# Patient Record
Sex: FEMALE | Race: WHITE
[De-identification: names, ages, dates, MRNs, and addresses within clinical notes are randomized per-mention and may not be internally consistent; named-entity substitution may affect disease eponyms.]

---
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
## 2020-06-25 LAB
ADD MANUAL DIFF: NO
ANION GAP SERPL CALC-SCNC: 12 MMOL/L (ref 5–19)
APPEARANCE UR: (no result)
APTT PPP: YELLOW S
BASOPHILS # BLD AUTO: 0.1 10^3/UL (ref 0–0.2)
BASOPHILS NFR BLD AUTO: 0.8 % (ref 0–2)
BILIRUB UR QL STRIP: NEGATIVE
BUN SERPL-MCNC: 16 MG/DL (ref 7–20)
CALCIUM: 9.9 MG/DL (ref 8.4–10.2)
CHLORIDE SERPL-SCNC: 96 MMOL/L (ref 98–107)
CO2 SERPL-SCNC: 29 MMOL/L (ref 22–30)
EOSINOPHIL # BLD AUTO: 0.2 10^3/UL (ref 0–0.6)
EOSINOPHIL NFR BLD AUTO: 2.4 % (ref 0–6)
ERYTHROCYTE [DISTWIDTH] IN BLOOD BY AUTOMATED COUNT: 14.2 % (ref 11.5–14)
GLUCOSE SERPL-MCNC: 192 MG/DL (ref 75–110)
GLUCOSE UR STRIP-MCNC: >=500 MG/DL
HCT VFR BLD CALC: 43.7 % (ref 36–47)
HGB BLD-MCNC: 14.5 G/DL (ref 12–15.5)
KETONES UR STRIP-MCNC: NEGATIVE MG/DL
LYMPHOCYTES # BLD AUTO: 3.7 10^3/UL (ref 0.5–4.7)
LYMPHOCYTES NFR BLD AUTO: 41.7 % (ref 13–45)
MCH RBC QN AUTO: 27.2 PG (ref 27–33.4)
MCHC RBC AUTO-ENTMCNC: 33.2 G/DL (ref 32–36)
MCV RBC AUTO: 82 FL (ref 80–97)
MONOCYTES # BLD AUTO: 0.7 10^3/UL (ref 0.1–1.4)
MONOCYTES NFR BLD AUTO: 8 % (ref 3–13)
NEUTROPHILS # BLD AUTO: 4.2 10^3/UL (ref 1.7–8.2)
NEUTS SEG NFR BLD AUTO: 47.1 % (ref 42–78)
NITRITE UR QL STRIP: NEGATIVE
PH UR STRIP: 5 [PH] (ref 5–9)
PLATELET # BLD: 432 10^3/UL (ref 150–450)
POTASSIUM SERPL-SCNC: 4.3 MMOL/L (ref 3.6–5)
PROT UR STRIP-MCNC: NEGATIVE MG/DL
RBC # BLD AUTO: 5.32 10^6/UL (ref 3.72–5.28)
SP GR UR STRIP: 1.02
TOTAL CELLS COUNTED % (AUTO): 100 %
UROBILINOGEN UR-MCNC: NEGATIVE MG/DL (ref ?–2)
WBC # BLD AUTO: 8.8 10^3/UL (ref 4–10.5)

## 2020-06-25 NOTE — RADIOLOGY REPORT (SQ)
EXAM DESCRIPTION:  CHEST PA/LATERAL



IMAGES COMPLETED DATE/TIME:  6/25/2020 10:33 am



REASON FOR STUDY:  PRE-OP



COMPARISON:  9/25/2018



EXAM PARAMETERS:  NUMBER OF VIEWS: two views

TECHNIQUE: Digital Frontal and Lateral radiographic views of the chest acquired.

RADIATION DOSE: NA

LIMITATIONS: none



FINDINGS:  LUNGS AND PLEURA: No opacities, masses or pneumothorax. No pleural effusion.

MEDIASTINUM AND HILAR STRUCTURES: No masses or contour abnormalities.

HEART AND VASCULAR STRUCTURES: Normal heart size.  Mild ectasia of the ascending aorta.

BONES: No acute findings.

HARDWARE: None in the chest.

OTHER: No other significant finding.



IMPRESSION:  NO SIGNIFICANT RADIOGRAPHIC FINDING IN THE CHEST.



TECHNICAL DOCUMENTATION:  JOB ID:  [id number]

 2011 [employer]- All Rights Reserved



Reading location - IP/workstation name: [email or web address]

## 2020-06-30 ENCOUNTER — HOSPITAL ENCOUNTER (OUTPATIENT)
Dept: HOSPITAL 62 - OROUT | Age: 70
LOS: 1 days | Discharge: HOME HEALTH | End: 2020-07-01
Attending: ORTHOPAEDIC SURGERY
Payer: COMMERCIAL

## 2020-06-30 DIAGNOSIS — Z79.82: ICD-10-CM

## 2020-06-30 DIAGNOSIS — M25.511: ICD-10-CM

## 2020-06-30 DIAGNOSIS — M75.121: ICD-10-CM

## 2020-06-30 DIAGNOSIS — Z79.84: ICD-10-CM

## 2020-06-30 DIAGNOSIS — Z79.899: ICD-10-CM

## 2020-06-30 DIAGNOSIS — M25.512: ICD-10-CM

## 2020-06-30 DIAGNOSIS — E11.9: ICD-10-CM

## 2020-06-30 DIAGNOSIS — M75.41: Primary | ICD-10-CM

## 2020-06-30 DIAGNOSIS — M75.122: ICD-10-CM

## 2020-06-30 DIAGNOSIS — J45.909: ICD-10-CM

## 2020-06-30 DIAGNOSIS — M19.011: ICD-10-CM

## 2020-06-30 DIAGNOSIS — Z03.818: ICD-10-CM

## 2020-06-30 LAB
GLUCOSE SERPL-MCNC: 159 MG/DL (ref 75–110)
POTASSIUM SERPL-SCNC: 4.6 MMOL/L (ref 3.6–5)

## 2020-06-30 PROCEDURE — 29826 SHO ARTHRS SRG DECOMPRESSION: CPT

## 2020-06-30 PROCEDURE — 01630 ANES OPN/ARTHR PX SHO JT NOS: CPT

## 2020-06-30 PROCEDURE — 93005 ELECTROCARDIOGRAM TRACING: CPT

## 2020-06-30 PROCEDURE — 71046 X-RAY EXAM CHEST 2 VIEWS: CPT

## 2020-06-30 PROCEDURE — 29806 SHO ARTHRS SRG CAPSULORRAPHY: CPT

## 2020-06-30 PROCEDURE — 81001 URINALYSIS AUTO W/SCOPE: CPT

## 2020-06-30 PROCEDURE — 84132 ASSAY OF SERUM POTASSIUM: CPT

## 2020-06-30 PROCEDURE — 85025 COMPLETE CBC W/AUTO DIFF WBC: CPT

## 2020-06-30 PROCEDURE — 29827 SHO ARTHRS SRG RT8TR CUF RPR: CPT

## 2020-06-30 PROCEDURE — C9803 HOPD COVID-19 SPEC COLLECT: HCPCS

## 2020-06-30 PROCEDURE — 80048 BASIC METABOLIC PNL TOTAL CA: CPT

## 2020-06-30 PROCEDURE — 82947 ASSAY GLUCOSE BLOOD QUANT: CPT

## 2020-06-30 PROCEDURE — 36415 COLL VENOUS BLD VENIPUNCTURE: CPT

## 2020-06-30 PROCEDURE — 87635 SARS-COV-2 COVID-19 AMP PRB: CPT

## 2020-06-30 PROCEDURE — 93010 ELECTROCARDIOGRAM REPORT: CPT

## 2020-06-30 PROCEDURE — 29828 SHO ARTHRS SRG BICP TENODSIS: CPT

## 2020-06-30 RX ADMIN — FENTANYL CITRATE PRN MCG: 50 INJECTION INTRAMUSCULAR; INTRAVENOUS at 14:37

## 2020-06-30 RX ADMIN — FENTANYL CITRATE PRN MCG: 50 INJECTION INTRAMUSCULAR; INTRAVENOUS at 14:32

## 2020-06-30 RX ADMIN — FENTANYL CITRATE PRN MCG: 50 INJECTION INTRAMUSCULAR; INTRAVENOUS at 14:27

## 2020-06-30 RX ADMIN — FENTANYL CITRATE PRN MCG: 50 INJECTION INTRAMUSCULAR; INTRAVENOUS at 14:22

## 2020-06-30 NOTE — DISCHARGE SUMMARY
Discharge Summary (SDC)





- Discharge


Final Diagnosis: 





Right shoulder rotator cuff tear


Date of Surgery: 06/30/20


Discharge Date: 06/30/20


Condition: Good


Treatment or Instructions: 


Schedule Follow Up w/ Dr. Abner Crump @ [facility] to be seen 

in 10-14 days or as scheduled


[place]: [phone] 


[place]: [phone]


[place]: [phone] 





May remove dressing on postop day #3, keep incision covered and dry.





Cryocuff to shoulder





May begin pendulum exercises along w/ hand, wrist and elbow range of motion 4x 

per day or as tolerated.





May remove sling for hygiene purposes otherwise continue it at all times.





Stool softener of choice when on pain medication.





USE OF OVER-THE-COUNTER IBUPROFEN:


     Ibuprofen (Advil, Nuprin, Medipren, Motrin IB) is a medication for fever 

and pain control.  In addition, it has anti- inflammatory effects which may be 

beneficial, especially in the treatment of injuries.


     It's best to take ibuprofen with food.  Persons with ulcer disease or 

allergy to aspirin should notify their physician of this before taking 

ibuprofen.


     Ibuprofen can be given every four to six hours, for a total of four doses 

daily.


     Age              Pain or fever dose          Antiinflammatory dose


     6-8 yr              200 mg (1 tab)                200 mg (1 tab)


     9-11 yr             200 mg (1 tab)                200-400 mg (1-2 tab)


     11-14 yr            200-400 mg (1-2 tab)         400 mg (2 tab)


     15-adult            400 mg (2 tab)                600 mg (3 tab)








ORAL NARCOTIC MEDICATION:


     You have been given a prescription for pain control.  This medication is a 

narcotic.  It's best taken with food, as nausea can result if taken on an empty 

stomach.


     Don't operate machinery or drive within six hours of taking this medic

ation.  Do not combine this medicine with alcohol, or with any medication which 

can cause sedation (such as cold tablets or sleeping pills) unless you get 

permission from the physician.


     Narcotics tend to cause constipation.  If possible, drink plenty of fluids 

and eat a diet high in fiber and fruits.





     Please be aware that prescription narcotics also have the potential for 

abuse.  People become addicted to these medications because of the general sense

of wellbeing that they induce.  This feeling along with a significant reduction 

in tension, anxiety, and aggression provides a stimulating seductive quality to 

these drugs.  Once your pain is under control, we encourage you to discard your 

unused narcotics.





Prescriptions: 


Oxycodone HCl/Acetaminophen [Percocet 5-325 mg Tablet] 1 tab PO Q6 PRN #25 tab


 PRN Reason: 


Referrals: 


OMAR AGUILAR MD [Primary Care Provider] - 


Discharge Diet: As Tolerated


Respiratory Treatments at Home: Deep Breathing/Coughing, Incentive Spirometer


Discharge Activity: No Lifting Over 10 Pounds, No Lifting/Push/Pulling


Report the Following to Your Physician Immediately: Fever over 101 Degrees, 

Unusual Bleeding, Redness, Swelling, Warmth, Increased Soreness

## 2020-06-30 NOTE — OPERATIVE REPORT
Operative Report


DATE OF SURGERY: 06/30/20


PREOPERATIVE DIAGNOSIS: Right shoulder rotator cuff tear, impingement syndrome, 

AC joint DJD


POSTOPERATIVE DIAGNOSIS: Same


OPERATION: Right shoulder arthroscopy with supraspinatus repair, subscapularis 

repair, distal clavicle excision, acromioplasty, subacromial decompression, 

arthroscopic biceps tenodesis


SURGEON: YOBANY CORDOVA


ANESTHESIA: GA


COMPLICATIONS: 





None


ESTIMATED BLOOD LOSS: Minimal


PROCEDURE: 





Indication for above procedure:





69-year-old female with complaints of right shoulder discomfort.  Patient had 

longstanding history of right shoulder discomfort attempted conservative 

measures including injections.  MRI was then performed consistent with rotator 

cuff tear at that point decision was made to proceed with operative 

intervention.  Risk and benefits were explained patient verbalized understanding

consented for surgical procedure.





Procedure In Detail:





Patient was seen and evaluated in the preoperative holding area.  The upper 

extremity was initialized and marked.  Patient received 2g of Ancef IV for 

bacterial prophylaxis.  Patient was taken back to the operative room where 

transferred to the operative table and placed under general anesthesia.  Once 

they were adequately anesthetized patient placed in the beachchair position.  

Cervical spine was placed in neutral position all bony prominences were padded 

including nonoperative upper extremity and bilateral lower extremity.  A 

surgical team debriefing was performed ensuring all instrumentation was 

available, the surgical procedure was discussed with possible concerns reviewed.

 The upper extremity was prepped with ChloraPrep draped in a sterile fashion.   

A timeout was done identifying correct patient, procedure and extremity everyone

in attendance agree with this and verbalized no concerns.





Posterior portal was established arthroscope was introduced into the 

glenohumeral joint via triangulation anterior portal was established.  

Diagnostic arthroscopy demonstrated superior third subscapularis tear with 

subluxation of the biceps tendon with significant degeneration along the labrum.

 Biceps tenotomy was performed to allow for later tenodesis.  Under direct 

visualization the capsular tissue was released in order to identify the comma 

tissue of the rotator interval and the superior border of the subscapularis.  

Once identified the subscapularis was mobilized.  Subcoracoid decompression was 

performed as well.  Once the subscapularis was adequately mobilized a horizontal

mattress fiber tape was placed to the subscapularis.  A suture tape horizontal 

mattress was placed the biceps tendon.  Under direct visualization the lesser 

tuberosity was debrided.  The sutures were then loaded on a swivel lock anchor 

and this was advanced and implanted.  Unfortunately patient's poor bone quality 

resulted in failure of this anchor and thus the islet was removed but the 

tenodesis screw remained within the bone.  Thus alternative approach was 

utilized.





Arthroscope was introduced into the subacromial space subacromial decompression 

was performed.  Acromioplasty was performed.  In order to obtain fixation of the

subscapularis decision was made to place a anterior superior anchor along the 

edge of the greater tuberosity just lateral to the bicipital groove.  This area 

was debrided and an additional 4.7 swivel lock anchor was loaded with the 

previous suture and advanced.  Adequate fixation was obtained with good tension 

on the subscapularis.  Attention then turned to the anterior supraspinatus tear.

 Horizontal mattress fiber tape was then placed and advanced to the swivel lock 

anchor.  The bone along the edge of the greater tuberosity was then prepared 

slightly laterally due to patient's poor bone quality adjacent to the articular 

surface the swivel lock anchor was then advanced obtaining good fixation of the 

anterior supraspinatus.  There was a defect between the subscapularis repair and

supraspinatus consistent with rotator interval this area was not closed in order

to avoid postoperative stiffness.





Through the anterior portal the AC joint was identified.  6 mm of distal 

clavicle was then excised.  The repair was then inspected to ensure adequate 

fixation there was stability throughout range of motion and the construct moved 

as a unit with motion.





Skin incisions were then closed with interrupted 3-0 nylon suture.  30 cc of 

0.5% bupivacaine without epinephrine was injected for postoperative pain 

control.  Wound was dressed Xeroform 4 x 4's and ABD.  Patient was placed in a 

arc sling immobilizer.





Sponge counts, instrument counts, needle counts were correct.  Patient was then 

awoken from anesthesia.  Transferred from the operating room table to the 

operating room stretcher.  There was no intraoperative complications patient 

tolerated procedure well stable to PACU.





Postoperative plan:





Patient will follow in the office in 2 weeks for wound check.  Will begin 

physical therapy 5 weeks postoperatively.  We will maintain no greater than 30 

degrees of external rotation until 6 weeks postoperatively and then follow 

rotator cuff protocol.

## 2020-07-01 VITALS — DIASTOLIC BLOOD PRESSURE: 47 MMHG | SYSTOLIC BLOOD PRESSURE: 97 MMHG
